# Patient Record
Sex: FEMALE | Race: WHITE | Employment: FULL TIME | URBAN - METROPOLITAN AREA
[De-identification: names, ages, dates, MRNs, and addresses within clinical notes are randomized per-mention and may not be internally consistent; named-entity substitution may affect disease eponyms.]

---

## 2018-12-12 ENCOUNTER — THERAPY VISIT (OUTPATIENT)
Dept: PHYSICAL THERAPY | Facility: CLINIC | Age: 48
End: 2018-12-12
Payer: COMMERCIAL

## 2018-12-12 DIAGNOSIS — M54.2 CERVICAL PAIN: ICD-10-CM

## 2018-12-12 PROCEDURE — 97161 PT EVAL LOW COMPLEX 20 MIN: CPT | Mod: GP | Performed by: PHYSICAL THERAPIST

## 2018-12-12 PROCEDURE — 97110 THERAPEUTIC EXERCISES: CPT | Mod: GP | Performed by: PHYSICAL THERAPIST

## 2018-12-12 NOTE — PROGRESS NOTES
[facility] Initial Evaluation  Subjective:  The history is provided by the patient. No  was used.   Margaret Alcantara is a 48 year old female with a cervical spine condition.      This is a new condition  The patient is reporting onset of L scapular pain in Sept 2018. Initially felt she pulled a muscle in her shoulder.  Was prescribed muscle relaxants without improvement and seen by a chiropractor twice without improvement. Received 2 cortisone injections in c-spine without relief.  The pain and tingling currently radiates down the L UE to her 3rd through 5th digits. The pain increases with bending, relaxing L arm, lifting and working. Pain improves by supporting the L arm and temporary improvement with ice and heat.    Per patient MRI performed at [facility] - no results in Epic..    Patient reports pain:  Cervical left side.  Radiates to:  Shoulder left, elbow left, hand left, upper arm left and lower arm left.  Pain is described as other and burning and is intermittent and reported as 3/10 and 4/10.  Associated symptoms:  Tingling and loss of strength. Pain is the same all the time.  Symptoms are exacerbated by lifting, looking up or down, other and lying down (bending) and relieved by ice and heat.  Since onset symptoms are gradually worsening.  Special tests:  MRI (results with tria).  Previous treatment includes chiropractic and other (cortisone injections).  There was no improvement following previous treatment.  General health as reported by patient is good.  Pertinent medical history includes:  Smoking and high blood pressure.  Medical allergies: no.  Other surgeries include:  No.  Current medications:  Anti-inflammatory and muscle relaxants.  Current occupation is CNA.  Patient is working in normal job without restrictions.  Primary job tasks include:  Lifting, repetitive tasks and other (push/pull).    Barriers include:  None as reported by the patient.    Red flags:  Pain  at rest/night.                        Objective:  Standing Alignment:    Cervical/Thoracic:  Forward head  Shoulder/UE:  Rounded shoulders                                  Cervical/Thoracic Evaluation  Arom wnl cervical: Repeated Cervical Retraction: improves L tricep strength, decreased tingling.     AROM:  AROM Cervical:    Flexion:          90%  Extension:       75%  Rotation:         Left: 80%     Right: 75%  Side Bend:      Left:     Right:         Cervical Myotomes:        C4 (shrug):  Left: 5    Right: 5  C5 (Deltoid):  Left: 5    Right: 5  C6 (Biceps):  Left: 5    Right: 5  C7 (Triceps):  Left: 4-    Right: 5  C8 (Thumb Ext): Left: 5    Right: 5  T1 (Intrinsics): Left: 5    Right: 5        Cervical Palpation:    Tenderness present at Left:    Upper Trap; Levator and Erector Spinae                                                    General     ROS    Assessment/Plan:    Patient is a 48 year old female with cervical complaints.    Patient has the following significant findings with corresponding treatment plan.                Diagnosis 1:  L sided cervical radiculopathy  Pain -  hot/cold therapy, manual therapy, education, directional preference exercise and home program  Decreased ROM/flexibility - manual therapy and therapeutic exercise  Decreased strength - therapeutic exercise and therapeutic activities  Impaired muscle performance - neuro re-education  Decreased function - therapeutic activities  Impaired posture - neuro re-education    Therapy Evaluation Codes:   1) History comprised of:   Personal factors that impact the plan of care:      Anxiety, Overall behavior pattern and Profession.    Comorbidity factors that impact the plan of care are:      High blood pressure and Smoking.     Medications impacting care: Anti-inflammatory and Muscle relaxant.  2) Examination of Body Systems comprised of:   Body structures and functions that impact the plan of care:      Cervical spine.   Activity limitations  that impact the plan of care are:      Bending, Lifting, Working and Laying down.  3) Clinical presentation characteristics are:   Stable/Uncomplicated.  4) Decision-Making    Low complexity using standardized patient assessment instrument and/or measureable assessment of functional outcome.  Cumulative Therapy Evaluation is: Low complexity.    Previous and current functional limitations:  (See Goal Flow Sheet for this information)    Short term and Long term goals: (See Goal Flow Sheet for this information)     Communication ability:  Patient appears to be able to clearly communicate and understand verbal and written communication and follow directions correctly.  Treatment Explanation - The following has been discussed with the patient:   RX ordered/plan of care  Anticipated outcomes  Possible risks and side effects  This patient would benefit from PT intervention to resume normal activities.   Rehab potential is good.    Frequency:  1 X week, once daily  Duration:  for 6 weeks  Discharge Plan:  Achieve all LTG.  Independent in home treatment program.  Reach maximal therapeutic benefit.    Please refer to the daily flowsheet for treatment today, total treatment time and time spent performing 1:1 timed codes.

## 2018-12-19 ENCOUNTER — THERAPY VISIT (OUTPATIENT)
Dept: PHYSICAL THERAPY | Facility: CLINIC | Age: 48
End: 2018-12-19
Payer: COMMERCIAL

## 2018-12-19 DIAGNOSIS — M54.2 CERVICAL PAIN: ICD-10-CM

## 2018-12-19 PROCEDURE — 97140 MANUAL THERAPY 1/> REGIONS: CPT | Mod: GP | Performed by: PHYSICAL THERAPIST

## 2018-12-19 PROCEDURE — 97110 THERAPEUTIC EXERCISES: CPT | Mod: GP | Performed by: PHYSICAL THERAPIST

## 2019-01-22 ENCOUNTER — THERAPY VISIT (OUTPATIENT)
Dept: CHIROPRACTIC MEDICINE | Facility: CLINIC | Age: 49
End: 2019-01-22
Payer: COMMERCIAL

## 2019-01-22 DIAGNOSIS — M54.2 CERVICALGIA: Primary | ICD-10-CM

## 2019-01-22 DIAGNOSIS — M99.02 THORACIC SEGMENT DYSFUNCTION: ICD-10-CM

## 2019-01-22 PROCEDURE — 98940 CHIROPRACT MANJ 1-2 REGIONS: CPT | Mod: AT | Performed by: CHIROPRACTOR

## 2019-01-22 PROCEDURE — 99203 OFFICE O/P NEW LOW 30 MIN: CPT | Mod: 25 | Performed by: CHIROPRACTOR

## 2019-01-22 PROCEDURE — 97035 APP MDLTY 1+ULTRASOUND EA 15: CPT | Performed by: CHIROPRACTOR

## 2019-01-22 NOTE — PROGRESS NOTES
Chiropractic Clinic Visit    PCP: Clinic, [facility]    Margaret Alcantara is a 48 year old female who is seen  as a self referral presenting with ongoing neck and left arm pain. Patient reports that the onset was Sept. 18, 2019. Patient reports she wondered if she pulled a muscle at work. Patient states the pain gradually increased and started to refer into the left arm. Patient states she had a MRI done which showed a herniated disc. Patient states she did try 2 injections without any relief. Patient reports starting physical therapy which has been somewhat helpful. Patient states she did consult with a spine surgeon who recommended surgery and disc replacement.  Prior to onset, the patient was able to look up or down without neck pain. Patient notes that due to symptoms, they can't look up or down without pain, any lifting increases neck pain. Margaret Alcantara notes   sleeping rated at a 4/10 difficulty  and prior to this incident it was 0/10.    Cervical MRI:  IMPRESSION:    1. At C6-7 there is intraforaminal protrusion on the left that is also slightly effacing the left neural foramen with probable mass effect on the left C7 nerve root.    2. At the C5-6 level there is mild bilateral neural foraminal narrowing. Other degenerative changes are as described above.     Injury: Unsure of mechanism, may have been due to lifting.     Location of Pain: left lower cervical spine, pain radiates to left shoulder blade and down left arm at the following level(s) C5 , C6 , C7  and T3   Duration of Pain: Since 9/18/18   Rating of Pain at worst: 8/10  Rating of Pain Currently: 3-4/10  Symptoms are better with: Rest  Symptoms are worse with: lifting, looking up or down  Additional Features: Pain travels at times to the left hand.        Health History  as reported by the patient:    How does the patient rate their own health:   Good    Current or past medical history:   No red flags identified    Medical  allergies  None    Past Traumas/Surgeries  Other:  none    Family History  This patient has no significant family history    Medications:  other:  gabapentin    Occupation:  HST    Primary job tasks:   Lifting/carrying and Pushing/pulling    Barriers as home/work:   none    Additional health Issues:     NA      Review of Systems  Musculoskeletal: as above  Remainder of review of systems is negative including constitutional, CV, pulmonary, GI, Skin and Neurologic except as noted in HPI or medical history.    No past medical history on file.  No past surgical history on file.    Objective  There were no vitals taken for this visit.    GENERAL APPEARANCE: healthy, alert and no distress   GAIT: NORMAL  SKIN: no suspicious lesions or rashes  NEURO: Normal strength and tone, mentation intact and speech normal  PSYCH:  mentation appears normal and affect normal/bright      Cervical Spine Exam    Range of Motion:         Slight reduction active and passive ROM forward lateral rotation, lateral flexion. Moderate reduction with neck on flexion and extension    Inspection:         No visible deformity        normal lordotic curvature maintained    Tender:        paraspinal muscles       upper border of trapezius    Non-Tender:        remainder of cervical spine area    Muscle strength:       C5 (shoulder abduction) symmetric 5/5 Normal       C6 (elbow flexion) symmetric 5/5 Normal       C7 (elbow extension) symmetric 5/5 Normal       C8 (finger abduction, thumb flexion) symmetric 5/5 Normal    Reflexes:        C5 (biceps) symmetric 2 bilaterally       C6 (supinator) symmetric 2 bilaterally       C7 (triceps) symmetric 2 bilaterally    Sensation:       grossly intact througout bilateral upper extremities    Special Tests:       Cervical compression-Neg, foraminal compression left and right-Neg  Distraction - negative and Bergeron Srivastava - negative    Lymphatics:        no edema noted in the upper extremities       Segmental spinal  dysfunction/restrictions found at:  C6 , C7  and T3       The following soft tissue hypotonicities were observed:Traps: ache and stiff, referred pain: no    Trigger points were found in:Traps    Muscle spasm found in:T-spine paraspinal and Traps      Radiology:  None today    Assessment:    No diagnosis found.    RX ordered/plan of care  Anticipated outcomes  Possible risks and side effects    After discussing the risk and benefits of care, patient consented to treatment    Patient's condition:  Patient had restrictions pre-manipulation    Treatment effectiveness:  Post manipulation there is better intersegmental movement and Patient claims to feel looser post manipulation    Plan:    Procedures:    Evaluation and Management:  15413 Moderate level exam 30 min    CMT:  71117 Chiropractic manipulative treatment 1-2 regions performed   Cervical: Gentle PtoA mob's and distraction-x5, C5 , C6, C7 , Supine  Thoracic: Activator, T3, T4, T5, Prone    Modalities:  63874: US:  2.0 Cid/cm squared for 8 minutes at 1 mhz  cont pulsed, Location:left c/t spine    Therapeutic procedures:  None    Response to Treatment  Patient tolerated the treatment well today.    Prognosis: fair-due to MRI showing a herniated disc      Treatment plan and goals:  Goals:  SITTING: the patient specific goal is to attain pre-injury status of  2 hours comfortably  PAIN: the patient specific goal of thier symptoms is to attain the pre-inury state of 0-1/10 on the VAS    Frequency of care  Duration of care is estimated to be 6 weeks, from the initial treatment.  It is estimated that the patient will need a total of 6 visits to resolve this episode.  For the initial therapeutic trial of care, the frequency is recommended at 1 X week, once daily.  A reevaluation would be clinically appropriate in 6 visits, to determine progress and further course of care.    In-Office Treatment  Evaluation  40661 Chiropractic manipulative treatment 1-2 regions performed    Cervical: Gentle PtoA mob's and distraction-x5, C5 , C6, C7 , Supine  Thoracic: Activator, T3, T4, T5, Prone    Modalities:  07423: US:  2.0 Cid/cm squared for 8 minutes at 1 mhz  cont pulsed, Location:left c/t spine    Possibly try acupuncture next visit    Recommendations:    Instructions:  ice 20 minutes every other hour as needed    Follow-up:  Return to care in one week.     Disclaimer: This note consists of symbols derived from keyboarding, dictation and/or voice recognition software. As a result, there may be errors in the script that have gone undetected. Please consider this when interpreting information found in this chart.

## 2019-02-05 ENCOUNTER — THERAPY VISIT (OUTPATIENT)
Dept: CHIROPRACTIC MEDICINE | Facility: CLINIC | Age: 49
End: 2019-02-05
Payer: COMMERCIAL

## 2019-02-05 DIAGNOSIS — M54.2 CERVICALGIA: ICD-10-CM

## 2019-02-05 DIAGNOSIS — M99.02 THORACIC SEGMENT DYSFUNCTION: ICD-10-CM

## 2019-02-05 DIAGNOSIS — M99.01 CERVICAL SEGMENT DYSFUNCTION: Primary | ICD-10-CM

## 2019-02-05 PROCEDURE — 98940 CHIROPRACT MANJ 1-2 REGIONS: CPT | Mod: AT | Performed by: CHIROPRACTOR

## 2019-02-05 PROCEDURE — 97810 ACUP 1/> WO ESTIM 1ST 15 MIN: CPT | Performed by: CHIROPRACTOR

## 2019-02-05 NOTE — PROGRESS NOTES
Visit #:  2    Subjective:  Margaret Alcantara is a 48 year old female who is seen in f/u up for: neck and left radicular arm pain. Patient was recommended for neck surgery by her specialist, however patient was improving and decided to wait and see how things progressed.     MRI:  IMPRESSION:    1. At C6-7 there is intraforaminal protrusion on the left that is also slightly effacing the left neural foramen with probable mass effect on the left C7 nerve root.    2. At the C5-6 level there is mild bilateral neural foraminal narrowing. Other degenerative changes are as described above.      Data Unavailable.     Since last visit on 1/22/2019,  Margaret Alcantara reports: she had less pain for 4-5 days post treatment. Patient reports after she worked on Friday, the neck and arm pain was greatly increased. States she's been in severe pain since Friday.     Area of chief complaint:  Cervical and Thoracic :  Symptoms are graded at 9/10. The quality is described as stiff, achey, sharp, dull.  Motion has remained about the same, no improvement, no change. Patient feels that they feel worse since working on Friday.     Since last visit the patient feels that they are 0 percent  improved from last visit.       Objective:  The following was observed:    P: palpatory tenderness T-spine paraspinal and Traps L>>R  A: static palpation demonstrates intersegmental asymmetry , cervical, thoracic  R: motion palpation notes restricted motion  T: muscle spasm at level(s): T-spine paraspinal and Traps L>>R    Segmental spinal dysfunction/restrictions found at:  C5 , C6 , C7 , T3  and T4       Assessment:    Diagnoses:    No diagnosis found.    Patient's condition:  Patient had restrictions pre-manipulation    Treatment effectiveness:  Post manipulation there is better intersegmental movement and Patient claims to feel looser post manipulation      Procedures:  CMT:  51466 Chiropractic manipulative treatment 1-2 regions performed   Cervical:  Gentle PtoA mob's and distraction-x5, Activator-C5  Thoracic: Activator, T3, T4, T5, Prone      Modalities:  77657: Acupuncture, for 15 minutes:  Points: for neck, left arm pain-patient prone 15min    Therapeutic procedures:  None    Response to Treatment  Patient tolerated the treatment well today.    Prognosis: Guarded-due to disc injury    Progress towards Goals: Patient has not made progress towards goal.     Recommendations:    Instructions:  ice 20 minutes every other hour as needed    Follow-up:    Return to care in 1-2 weeks. Patient reports she will be seeing another specialist. States she might go back to surgeon.

## 2019-03-14 NOTE — PROGRESS NOTES
Discharge Note    Progress reporting period is from initial evaluation date (please see noted date below) to Dec 19, 2018.  Linked Episodes   Type: Episode: Status: Noted: Resolved: Last update: Updated by:   PHYSICAL THERAPY neck Active 12/12/2018 12/19/2018  2:11 PM Nova Jordan, PT      Comments:       Margaret failed to follow up and current status is unknown.  Please see information below for last relevant information on current status.  Patient seen for 2 visits.    SUBJECTIVE  Subjective changes noted by patient:  Patient reports no change in symptoms since last visit.  Previous BRENDAN have not helped.  She no longer needs to rest her arm on top of her head for pain relief, so she feels she might be making progress.  Patient will be going on light duty at work.  Changes in function:  Yes (See Goal flowsheet attached for changes in current functional level)  Adverse reaction to treatment or activity: None    OBJECTIVE  Changes noted in objective findings: AROM: Ext 80%, Flex ERP.     ASSESSMENT/PLAN  Diagnosis: L sided cervical radiculopathy   Updated problem list and treatment plan:   Pain - HEP  Decreased ROM/flexibility - HEP  Decreased function - HEP  Decreased strength - HEP  STG/LTGs have been met or progress has been made towards goals:  Yes, please see goal flowsheet for most current information  Assessment of Progress: current status is unknown.    Last current status: Pt is progressing slower than anticipated   Self Management Plans:  HEP  I have re-evaluated this patient and find that the nature, scope, duration and intensity of the therapy is appropriate for the medical condition of the patient.  Margaret continues to require the following intervention to meet STG and LTG's:  HEP.    Recommendations:  Discharge with current home program.  Patient to follow up with MD as needed.    Please refer to the daily flowsheet for treatment today, total treatment time and time spent performing 1:1 timed  codes.